# Patient Record
Sex: FEMALE | Race: BLACK OR AFRICAN AMERICAN | NOT HISPANIC OR LATINO | URBAN - NONMETROPOLITAN AREA
[De-identification: names, ages, dates, MRNs, and addresses within clinical notes are randomized per-mention and may not be internally consistent; named-entity substitution may affect disease eponyms.]

---

## 2020-08-28 ENCOUNTER — IMPORTED ENCOUNTER (OUTPATIENT)
Dept: URBAN - NONMETROPOLITAN AREA CLINIC 1 | Facility: CLINIC | Age: 50
End: 2020-08-28

## 2020-08-28 PROBLEM — B30.0: Noted: 2020-08-28

## 2020-08-28 PROCEDURE — 99202 OFFICE O/P NEW SF 15 MIN: CPT

## 2020-08-28 NOTE — PATIENT DISCUSSION
Presumed EKC OD- 1 week symptoms red runny right eye without change in vision; enlarged painful pre-auricular node on right side.- Recommend 1 gtt of Provoidoine prior to leaving the office.- Recommend D/C FML.- Recommend art. tears for comfort.- Recommend follow-up on Monday if symtoms persist.

## 2020-08-31 ENCOUNTER — IMPORTED ENCOUNTER (OUTPATIENT)
Dept: URBAN - NONMETROPOLITAN AREA CLINIC 1 | Facility: CLINIC | Age: 50
End: 2020-08-31

## 2020-08-31 PROCEDURE — 99212 OFFICE O/P EST SF 10 MIN: CPT

## 2022-04-09 ASSESSMENT — VISUAL ACUITY
OS_SC: 20/20-1
OS_CC: 20/20-1
OD_SC: 20/20-2
OD_CC: 20/20-2